# Patient Record
Sex: FEMALE | Race: WHITE | NOT HISPANIC OR LATINO
[De-identification: names, ages, dates, MRNs, and addresses within clinical notes are randomized per-mention and may not be internally consistent; named-entity substitution may affect disease eponyms.]

---

## 2017-12-22 ENCOUNTER — APPOINTMENT (OUTPATIENT)
Dept: PULMONOLOGY | Facility: CLINIC | Age: 77
End: 2017-12-22
Payer: MEDICARE

## 2017-12-22 DIAGNOSIS — R06.02 SHORTNESS OF BREATH: ICD-10-CM

## 2017-12-22 PROCEDURE — 82803 BLOOD GASES ANY COMBINATION: CPT

## 2017-12-22 PROCEDURE — 36600 WITHDRAWAL OF ARTERIAL BLOOD: CPT | Mod: 59

## 2018-04-06 ENCOUNTER — INPATIENT (INPATIENT)
Facility: HOSPITAL | Age: 78
LOS: 0 days | Discharge: ANOTHER HOSPITAL | End: 2018-04-06
Attending: INTERNAL MEDICINE | Admitting: INTERNAL MEDICINE
Payer: MEDICARE

## 2018-04-06 ENCOUNTER — TRANSCRIPTION ENCOUNTER (OUTPATIENT)
Age: 78
End: 2018-04-06

## 2018-04-06 VITALS
SYSTOLIC BLOOD PRESSURE: 145 MMHG | DIASTOLIC BLOOD PRESSURE: 74 MMHG | TEMPERATURE: 98 F | HEART RATE: 75 BPM | OXYGEN SATURATION: 100 % | RESPIRATION RATE: 18 BRPM

## 2018-04-06 VITALS
SYSTOLIC BLOOD PRESSURE: 133 MMHG | HEART RATE: 76 BPM | TEMPERATURE: 98 F | OXYGEN SATURATION: 96 % | RESPIRATION RATE: 16 BRPM | DIASTOLIC BLOOD PRESSURE: 95 MMHG

## 2018-04-06 DIAGNOSIS — R55 SYNCOPE AND COLLAPSE: ICD-10-CM

## 2018-04-06 DIAGNOSIS — E78.5 HYPERLIPIDEMIA, UNSPECIFIED: ICD-10-CM

## 2018-04-06 DIAGNOSIS — M62.82 RHABDOMYOLYSIS: ICD-10-CM

## 2018-04-06 LAB
ALBUMIN SERPL ELPH-MCNC: 3.7 G/DL — SIGNIFICANT CHANGE UP (ref 3.3–5)
ALP SERPL-CCNC: 59 U/L — SIGNIFICANT CHANGE UP (ref 40–120)
ALT FLD-CCNC: 30 U/L — SIGNIFICANT CHANGE UP (ref 4–33)
APTT BLD: 27 SEC — LOW (ref 27.5–37.4)
AST SERPL-CCNC: 59 U/L — HIGH (ref 4–32)
BASE EXCESS BLDV CALC-SCNC: 4.1 MMOL/L — SIGNIFICANT CHANGE UP
BASOPHILS # BLD AUTO: 0.04 K/UL — SIGNIFICANT CHANGE UP (ref 0–0.2)
BASOPHILS NFR BLD AUTO: 0.4 % — SIGNIFICANT CHANGE UP (ref 0–2)
BILIRUB SERPL-MCNC: 0.6 MG/DL — SIGNIFICANT CHANGE UP (ref 0.2–1.2)
BLOOD GAS VENOUS - CREATININE: 0.79 MG/DL — SIGNIFICANT CHANGE UP (ref 0.5–1.3)
BUN SERPL-MCNC: 22 MG/DL — SIGNIFICANT CHANGE UP (ref 7–23)
CALCIUM SERPL-MCNC: 8.2 MG/DL — LOW (ref 8.4–10.5)
CHLORIDE BLDV-SCNC: 99 MMOL/L — SIGNIFICANT CHANGE UP (ref 96–108)
CHLORIDE SERPL-SCNC: 97 MMOL/L — LOW (ref 98–107)
CK MB BLD-MCNC: 0.7 — SIGNIFICANT CHANGE UP (ref 0–2.5)
CK MB BLD-MCNC: 8.39 NG/ML — HIGH (ref 1–4.7)
CK SERPL-CCNC: 1153 U/L — HIGH (ref 25–170)
CK SERPL-CCNC: 1351 U/L — HIGH (ref 25–170)
CO2 SERPL-SCNC: 27 MMOL/L — SIGNIFICANT CHANGE UP (ref 22–31)
CREAT SERPL-MCNC: 0.95 MG/DL — SIGNIFICANT CHANGE UP (ref 0.5–1.3)
EOSINOPHIL # BLD AUTO: 0.03 K/UL — SIGNIFICANT CHANGE UP (ref 0–0.5)
EOSINOPHIL NFR BLD AUTO: 0.3 % — SIGNIFICANT CHANGE UP (ref 0–6)
GAS PNL BLDV: 135 MMOL/L — LOW (ref 136–146)
GLUCOSE BLDV-MCNC: 113 — HIGH (ref 70–99)
GLUCOSE SERPL-MCNC: 120 MG/DL — HIGH (ref 70–99)
HCO3 BLDV-SCNC: 26 MMOL/L — SIGNIFICANT CHANGE UP (ref 20–27)
HCT VFR BLD CALC: 39.7 % — SIGNIFICANT CHANGE UP (ref 34.5–45)
HCT VFR BLDV CALC: 41.9 % — SIGNIFICANT CHANGE UP (ref 34.5–45)
HGB BLD-MCNC: 13 G/DL — SIGNIFICANT CHANGE UP (ref 11.5–15.5)
HGB BLDV-MCNC: 13.6 G/DL — SIGNIFICANT CHANGE UP (ref 11.5–15.5)
IMM GRANULOCYTES # BLD AUTO: 0.03 # — SIGNIFICANT CHANGE UP
IMM GRANULOCYTES NFR BLD AUTO: 0.3 % — SIGNIFICANT CHANGE UP (ref 0–1.5)
INR BLD: 1.11 — SIGNIFICANT CHANGE UP (ref 0.88–1.17)
LACTATE BLDV-MCNC: 1.4 MMOL/L — SIGNIFICANT CHANGE UP (ref 0.5–2)
LYMPHOCYTES # BLD AUTO: 1.22 K/UL — SIGNIFICANT CHANGE UP (ref 1–3.3)
LYMPHOCYTES # BLD AUTO: 13 % — SIGNIFICANT CHANGE UP (ref 13–44)
MCHC RBC-ENTMCNC: 30.3 PG — SIGNIFICANT CHANGE UP (ref 27–34)
MCHC RBC-ENTMCNC: 32.7 % — SIGNIFICANT CHANGE UP (ref 32–36)
MCV RBC AUTO: 92.5 FL — SIGNIFICANT CHANGE UP (ref 80–100)
MONOCYTES # BLD AUTO: 1.11 K/UL — HIGH (ref 0–0.9)
MONOCYTES NFR BLD AUTO: 11.8 % — SIGNIFICANT CHANGE UP (ref 2–14)
NEUTROPHILS # BLD AUTO: 6.96 K/UL — SIGNIFICANT CHANGE UP (ref 1.8–7.4)
NEUTROPHILS NFR BLD AUTO: 74.2 % — SIGNIFICANT CHANGE UP (ref 43–77)
NRBC # FLD: 0 — SIGNIFICANT CHANGE UP
NT-PROBNP SERPL-SCNC: 546.8 PG/ML — SIGNIFICANT CHANGE UP
PCO2 BLDV: 49 MMHG — SIGNIFICANT CHANGE UP (ref 41–51)
PH BLDV: 7.38 PH — SIGNIFICANT CHANGE UP (ref 7.32–7.43)
PLATELET # BLD AUTO: 196 K/UL — SIGNIFICANT CHANGE UP (ref 150–400)
PMV BLD: 11.1 FL — SIGNIFICANT CHANGE UP (ref 7–13)
PO2 BLDV: 28 MMHG — LOW (ref 35–40)
POTASSIUM BLDV-SCNC: 3.4 MMOL/L — SIGNIFICANT CHANGE UP (ref 3.4–4.5)
POTASSIUM SERPL-MCNC: 3.6 MMOL/L — SIGNIFICANT CHANGE UP (ref 3.5–5.3)
POTASSIUM SERPL-SCNC: 3.6 MMOL/L — SIGNIFICANT CHANGE UP (ref 3.5–5.3)
PROT SERPL-MCNC: 6.9 G/DL — SIGNIFICANT CHANGE UP (ref 6–8.3)
PROTHROM AB SERPL-ACNC: 12.3 SEC — SIGNIFICANT CHANGE UP (ref 9.8–13.1)
RBC # BLD: 4.29 M/UL — SIGNIFICANT CHANGE UP (ref 3.8–5.2)
RBC # FLD: 13.7 % — SIGNIFICANT CHANGE UP (ref 10.3–14.5)
SAO2 % BLDV: 45.9 % — LOW (ref 60–85)
SODIUM SERPL-SCNC: 137 MMOL/L — SIGNIFICANT CHANGE UP (ref 135–145)
TROPONIN T SERPL-MCNC: < 0.06 NG/ML — SIGNIFICANT CHANGE UP (ref 0–0.06)
TROPONIN T SERPL-MCNC: < 0.06 NG/ML — SIGNIFICANT CHANGE UP (ref 0–0.06)
TSH SERPL-MCNC: 0.87 UIU/ML — SIGNIFICANT CHANGE UP (ref 0.27–4.2)
WBC # BLD: 9.39 K/UL — SIGNIFICANT CHANGE UP (ref 3.8–10.5)
WBC # FLD AUTO: 9.39 K/UL — SIGNIFICANT CHANGE UP (ref 3.8–10.5)

## 2018-04-06 PROCEDURE — 93971 EXTREMITY STUDY: CPT | Mod: 26

## 2018-04-06 PROCEDURE — 70450 CT HEAD/BRAIN W/O DYE: CPT | Mod: 26

## 2018-04-06 PROCEDURE — 71046 X-RAY EXAM CHEST 2 VIEWS: CPT | Mod: 26

## 2018-04-06 RX ORDER — LEVOTHYROXINE SODIUM 125 MCG
75 TABLET ORAL DAILY
Qty: 0 | Refills: 0 | Status: DISCONTINUED | OUTPATIENT
Start: 2018-04-06 | End: 2018-04-06

## 2018-04-06 RX ORDER — NORTRIPTYLINE HYDROCHLORIDE 10 MG/1
100 CAPSULE ORAL DAILY
Qty: 0 | Refills: 0 | Status: DISCONTINUED | OUTPATIENT
Start: 2018-04-06 | End: 2018-04-06

## 2018-04-06 RX ORDER — NORTRIPTYLINE HYDROCHLORIDE 10 MG/1
100 CAPSULE ORAL
Qty: 0 | Refills: 0 | COMMUNITY

## 2018-04-06 RX ORDER — FUROSEMIDE 40 MG
20 TABLET ORAL ONCE
Qty: 0 | Refills: 0 | Status: COMPLETED | OUTPATIENT
Start: 2018-04-06 | End: 2018-04-06

## 2018-04-06 RX ORDER — EZETIMIBE 10 MG/1
1 TABLET ORAL
Qty: 0 | Refills: 0 | COMMUNITY

## 2018-04-06 RX ORDER — CAPTOPRIL 12.5 MG/1
0 TABLET ORAL
Qty: 0 | Refills: 0 | COMMUNITY

## 2018-04-06 RX ORDER — LEVOTHYROXINE SODIUM 125 MCG
1 TABLET ORAL
Qty: 0 | Refills: 0 | COMMUNITY

## 2018-04-06 RX ORDER — SODIUM CHLORIDE 9 MG/ML
1000 INJECTION INTRAMUSCULAR; INTRAVENOUS; SUBCUTANEOUS
Qty: 0 | Refills: 0 | Status: DISCONTINUED | OUTPATIENT
Start: 2018-04-06 | End: 2018-04-06

## 2018-04-06 RX ADMIN — Medication 20 MILLIGRAM(S): at 10:27

## 2018-04-06 RX ADMIN — NORTRIPTYLINE HYDROCHLORIDE 100 MILLIGRAM(S): 10 CAPSULE ORAL at 15:21

## 2018-04-06 RX ADMIN — SODIUM CHLORIDE 75 MILLILITER(S): 9 INJECTION INTRAMUSCULAR; INTRAVENOUS; SUBCUTANEOUS at 12:28

## 2018-04-06 NOTE — H&P ADULT - NSHPLABSRESULTS_GEN_ALL_CORE
13.0   9.39  )-----------( 196      ( 06 Apr 2018 09:27 )             39.7     04-06    137  |  97<L>  |  22  ----------------------------<  120<H>  3.6   |  27  |  0.95    Ca    8.2<L>      06 Apr 2018 09:27    TPro  6.9  /  Alb  3.7  /  TBili  0.6  /  DBili  x   /  AST  59<H>  /  ALT  30  /  AlkPhos  59  04-06    CARDIAC MARKERS ( 06 Apr 2018 09:27 )  x     / < 0.06 ng/mL / 1351 u/L / x     / x          EKG NSR @ 75 with PVC, ILBB

## 2018-04-06 NOTE — DISCHARGE NOTE ADULT - CARE PLAN
Principal Discharge DX:	Syncope  Goal:	Treat reversible causes of syncope  Assessment and plan of treatment:	You were admitted to cardiac monitoring unit to rule out arrhthymias, you've were noted to have intraventricular conduction delay  A CAT of the head was unremarkable  You were ruled out for myocardial infarction with 2 negative troponins  You were found to be orthostatic positive and you were started on IV hydration  Fall precautions: keep your area clutter free, place night lights in hallways and stairwells, and add non-slip mats in the kitchen and bathrooms.  Exercise daily to improve muscle strength to avoid deconditioning.   You are being transferred to [facility] for further treatment and workup  Secondary Diagnosis:	Traumatic rhabdomyolysis, initial encounter  Goal:	Monitor CK levels  Assessment and plan of treatment:	We held your Zetia during your admission for elevated CPK levels  You received IV hydration to protect your kidney from damage from elevated CPK levels  Your CPK levels were improving with IV hydration/fluids  Follow up at [facility] to determine when it's safe to re-start Zetia  Secondary Diagnosis:	Essential hypertension  Goal:	To maintain a normal blood pressure to prevent heart attack, stroke and renal failure.  Assessment and plan of treatment:	Low sodium diet, continue blood pressure medications as instructed by your primary care physician. Monitor your BP regularly.  Secondary Diagnosis:	Hyperlipidemia, unspecified hyperlipidemia type  Goal:	To maintain normal cholesterol levels to prevent stroke, coronary artery disease, peripheral vascular disease and heart attacks.  Assessment and plan of treatment:	Low fat diet, exercise daily. Restart your cholesterol medications when deemed safe by your doctor. Follow up with primary care physician and cardiologist for management.  Secondary Diagnosis:	Fibromyalgia  Goal:	Manage symptoms  Assessment and plan of treatment:	Cont nortriptyline  Secondary Diagnosis:	Hypothyroidism  Goal:	To maintain a normal thyroid level.  Assessment and plan of treatment:	Continue your thyroid medications as prescribed  Your TSH was 0.87, within normal limits  Secondary Diagnosis:	Edema of right lower extremity  Goal:	Decrease edema of extremity  Assessment and plan of treatment:	A duplex was obtained of your right lower leg and was negative for DVT.   Elevate your legs regularly to decrease edema and swelling.

## 2018-04-06 NOTE — H&P ADULT - CONSTITUTIONAL DETAILS
well-developed/well-nourished/no distress/well-groomed well-developed/obese/well-groomed/no distress/well-nourished

## 2018-04-06 NOTE — H&P ADULT - HISTORY OF PRESENT ILLNESS
77 year old female pmh of HTN, HLD, Firbomyalgia, lymphedema, OA, JAMMIE on CPAP,  Hypothyroid, allergic sinusitis p/w multiple falls/syncopal episdoes. States that has had 2 falls in last two days.  Notes dizziness before falls, sometimes hard to focus vision and other times like she will fall. When this happens collapses to ground. So far no head trauma or other bodily trauma. Notes that chronically has intermittent episodes of blood pressure being high and then dropping. Often feels like her legs can not keep her up. occasionally dizzy after standing from seated position. Was at [facility] on 4/5 after her first fall, gave her fluid and checked her blood sugar and discharged her, she was told her EKG was okay when at [place]. Right leg edema is worse than usual Denies CP, palpitations, fever, chills, SOB, orthopnea, PND, abd pain, dysuria, hematuria, BRBPR, melena, visual change, headache, weakness.

## 2018-04-06 NOTE — DISCHARGE NOTE ADULT - HOSPITAL COURSE
HPI:  77 year old female pmh of HTN, HLD, Firbomyalgia, lymphedema, OA, JAMMIE on CPAP,  Hypothyroid, allergic sinusitis p/w multiple falls/syncopal episodes States that has had 2 falls in last two days.  Notes dizziness before falls, sometimes hard to focus vision and other times like she will fall. When this happens collapses to ground. So far no head trauma or other bodily trauma. Notes that chronically has intermittent episodes of blood pressure being high and then dropping. Often feels like her legs can not keep her up. Occasionally dizzy after standing from seated position. Was at [facility] on 4/5 after her first fall, gave her fluid and checked her blood sugar and discharged her, she was told her EKG was okay when at [place]. Right leg edema is worse than usual Denies CP, palpitations, fever, chills, SOB, orthopnea, PND, abd pain, dysuria, hematuria, BRBPR, melena, visual change, headache, weakness. (06 Apr 2018 11:02)    Hospital Course:   Pt admitted to telemetry to r/o cardiac syncope. EKG showed: NSR @ 75 with PVC, ILBB. On tele, ICVD was noted. Orthostatics obtained were positive and pt was started on IVF. Pt also noted to have an elevated CPK in setting of fall, which improved with IV fluids. Cardiac enzymes x 2 were negative. CT head was negative. Pt then transferred to [facility] for further treatment and evaluation. HPI:  77 year old female pmh of HTN, HLD, Firbomyalgia, lymphedema, OA, JAMMIE on CPAP,  Hypothyroid, allergic sinusitis p/w multiple falls/syncopal episodes States that has had 2 falls in last two days.  Notes dizziness before falls, sometimes hard to focus vision and other times like she will fall. When this happens collapses to ground. So far no head trauma or other bodily trauma. Notes that chronically has intermittent episodes of blood pressure being high and then dropping. Often feels like her legs can not keep her up. Occasionally dizzy after standing from seated position. Was at [facility] on 4/5 after her first fall, gave her fluid and checked her blood sugar and discharged her, she was told her EKG was okay when at [place]. Right leg edema is worse than usual Denies CP, palpitations, fever, chills, SOB, orthopnea, PND, abd pain, dysuria, hematuria, BRBPR, melena, visual change, headache, weakness. (06 Apr 2018 11:02)    Hospital Course:   Pt admitted to telemetry to r/o cardiac syncope. EKG showed: NSR @ 75 with PVC, ILBB. On tele, IVCD was noted. Orthostatics obtained were positive and pt was started on IVF. Pt also noted to have an elevated CPK in setting of fall, which improved with IV fluids. Cardiac enzymes x 2 were negative. CT head was negative. Pt then transferred to [facility] for further treatment and evaluation.

## 2018-04-06 NOTE — CONSULT NOTE ADULT - SUBJECTIVE AND OBJECTIVE BOX
cc syncope  [place] 77 year old female pmh of HTN, HLD, Firbomyalgia, lymphedema, OA, JAMMIE on CPAP,  Hypothyroid, allergic sinusitis p/w multiple falls/syncopal episdoes. States that has had 2 falls in last two days.  Notes dizziness before falls, sometimes hard to focus vision and other times like she will fall. When this happens collapses to ground. So far no head trauma or other bodily trauma. Notes that chronically has intermittent episodes of blood pressure being high and then dropping. Often feels like her legs can not keep her up. occasionally dizzy after standing from seated position. Was at [facility] on 4/5 after her first fall, gave her fluid and checked her blood sugar and discharged her, she was told her EKG was okay when at [place]. Right leg edema is worse than usual Denies CP, palpitations, fever, chills, SOB, orthopnea, PND, abd pain, dysuria, hematuria, BRBPR, melena, visual change, headache, weakness.     Allergies Iodine, statin    REVIEW OF SYSTEMS:    CONSTITUTIONAL: No weakness, fevers or chills  EYES/ENT: No visual changes;  No vertigo or throat pain   NECK: No pain or stiffness  RESPIRATORY: No cough, wheezing, hemoptysis; No shortness of breath  CARDIOVASCULAR: No chest pain or palpitations  GASTROINTESTINAL: No abdominal or epigastric pain. No nausea, vomiting, or hematemesis; No diarrhea or constipation. No melena or hematochezia.  GENITOURINARY: No dysuria, frequency or hematuria  NEUROLOGICAL: No numbness or weakness  SKIN: No itching, burning, rashes, or lesions   All other review of systems is negative unless indicated above.      Home Medications:   * Patient Currently Takes Medications as of 06-Apr-2018 11:16 documented in Structured Notes  · 	nortriptyline: 100 milligram(s) orally once a day, Last Dose Taken:    · 	Zetia 10 mg oral tablet: 1 tab(s) orally once a day, Last Dose Taken:    · 	Synthroid 75 mcg (0.075 mg) oral tablet: 1 tab(s) orally once a day, Last Dose Taken:    · 	captopril: only takes as needed when BP elevated, Last Dose Taken:      .    Patient History:   Past Medical History:  Arthritis    Essential hypertension    Fibromyalgia    Hyperlipidemia, unspecified hyperlipidemia type    Incontinence    Sinusitis.    Past Surgical History:  No significant past surgical history.  Family History:  Sibling  Still living? No  Family history of colon cancer, Age at diagnosis: 61-70.    Social History:  Social History (marital status, living situation, occupation, tobacco use, alcohol and drug use, and sexual history): Lives with , retired teacher  Never smoked, no etoh, no illicit drug NO assistive devices at home, no skilled nursing help at home	    Tobacco Screening:  · Core Measure Site	No	      Physical exam    General: WN/WD NAD  PERRLA  Neurology: A&Ox3, nonfocal, PHILLIP x 4  Respiratory: CTA B/L  CV: RRR, S1S2, no murmurs, rubs or gallops  Abdominal: Soft, NT, ND +BS, Last BM  Extremities: No edema, + peripheral pulses  Skin Normal     labs    Lab Results:  CBC  CBC Full  -  ( 06 Apr 2018 09:27 )  WBC Count : 9.39 K/uL  Hemoglobin : 13.0 g/dL  Hematocrit : 39.7 %  Platelet Count - Automated : 196 K/uL  Mean Cell Volume : 92.5 fL  Mean Cell Hemoglobin : 30.3 pg  Mean Cell Hemoglobin Concentration : 32.7 %  Auto Neutrophil # : 6.96 K/uL  Auto Lymphocyte # : 1.22 K/uL  Auto Monocyte # : 1.11 K/uL  Auto Eosinophil # : 0.03 K/uL  Auto Basophil # : 0.04 K/uL  Auto Neutrophil % : 74.2 %  Auto Lymphocyte % : 13.0 %  Auto Monocyte % : 11.8 %  Auto Eosinophil % : 0.3 %  Auto Basophil % : 0.4 %    .		Differential:	[] Automated		[] Manual  Chemistry                        13.0   9.39  )-----------( 196      ( 06 Apr 2018 09:27 )             39.7     04-06    137  |  97<L>  |  22  ----------------------------<  120<H>  3.6   |  27  |  0.95    Ca    8.2<L>      06 Apr 2018 09:27    TPro  6.9  /  Alb  3.7  /  TBili  0.6  /  DBili  x   /  AST  59<H>  /  ALT  30  /  AlkPhos  59  04-06    LIVER FUNCTIONS - ( 06 Apr 2018 09:27 )  Alb: 3.7 g/dL / Pro: 6.9 g/dL / ALK PHOS: 59 u/L / ALT: 30 u/L / AST: 59 u/L / GGT: x           PT/INR - ( 06 Apr 2018 09:27 )   PT: 12.3 SEC;   INR: 1.11          PTT - ( 06 Apr 2018 09:27 )  PTT:27.0 SEC          MICROBIOLOGY/CULTURES:      RADIOLOGY RESULTS: reviewed

## 2018-04-06 NOTE — ED PROVIDER NOTE - PHYSICAL EXAMINATION
Pt appears to have chronic medical morbidities, may have fluid retention. Mild facial asymmetry which pt states is chronic, no pronator drift or other acute neurologic deficits.

## 2018-04-06 NOTE — H&P ADULT - NSHPSOCIALHISTORY_GEN_ALL_CORE
Lives with , retired teacher  Never smoked, no etoh, no illicit drug  NO assistive devices at home, no skilled nursing help at home

## 2018-04-06 NOTE — ED PROVIDER NOTE - PROGRESS NOTE DETAILS
Vinnie ALTMAN: I was called to perform stroke evaluation in triage.  Pt c/o bilateral leg weakness, inability to stand.  Sxs started yesterday and worse since this morning.  Pt also noted to have facial droop, which she states is old.  Strength equal in bilat lower extremities 4/5, no other focal deficits.  No stroke code. DORINA Canada: Unit Receptionist attempted to contact PCP multiple times at phone numbers listed in HPI, unable to reach PMD. PMD Kristine Chan is not listed as having admitting privileges and pt states she does not have a cardiologist. DORINA Canada, PCP Dr. Chan called back, states pt has additional history of HTN, HLD sleep apnea not on additional meds. Dr. Chan agres to have pt admitted to [facility], states pt does not have a cardiologist and she does not admit to [facility].

## 2018-04-06 NOTE — DISCHARGE NOTE ADULT - MEDICATION SUMMARY - MEDICATIONS TO TAKE
I will START or STAY ON the medications listed below when I get home from the hospital:    captopril  -- only takes as needed when BP elevated  -- Indication: For Essential hypertension    nortriptyline  -- 100 milligram(s) by mouth once a day  -- Indication: For Fibromyalgia    Zetia 10 mg oral tablet  -- 1 tab(s) by mouth once a day  -- Indication: For Hyperlipidemia, unspecified hyperlipidemia type    Synthroid 75 mcg (0.075 mg) oral tablet  -- 1 tab(s) by mouth once a day  -- Indication: For Hypothyroidism

## 2018-04-06 NOTE — ED PROVIDER NOTE - MUSCULOSKELETAL, MLM
Spine appears normal, range of motion is not limited, no muscle or joint tenderness Spine appears normal, range of motion is not limited, no muscle or joint tenderness 3+ edema BLE

## 2018-04-06 NOTE — H&P ADULT - NSHPREVIEWOFSYSTEMS_GEN_ALL_CORE
Denies CP, palpitations, fever, chills, SOB, orthopnea, PND, abd pain, dysuria, hematuria, BRBPR, melena, visual change, headache, weakness.

## 2018-04-06 NOTE — ED ADULT NURSE NOTE - OBJECTIVE STATEMENT
Pt rcvd to room 5 c/o weakness, states she fell after legs "gave in and buckled." Denies feeling dizzy or lightheaded before falling. AS per pt, weakness was mostly in the legs. Also c/o cough. Denies fever/chills, N/V/D, urinary symptoms. + significant swelling/redness observed to B/L lower extrem. As per pt, this is chronic. Denies SOB at this time. Pt is NSR on cardiac monitor with occasional PVCs. Aox3, ambulatory at baseline. Evaluated by MD. Labs drawn & sent. 20g IV placed to L ac. Will continue to monitor.

## 2018-04-06 NOTE — H&P ADULT - PMH
Arthritis    Essential hypertension    Fibromyalgia    Hyperlipidemia, unspecified hyperlipidemia type    Incontinence    Sinusitis

## 2018-04-06 NOTE — DISCHARGE NOTE ADULT - PATIENT PORTAL LINK FT
You can access the [facility] Patient Portal, offered by [facility], by registering with the following website: [email or web address]

## 2018-04-06 NOTE — ED PROVIDER NOTE - OBJECTIVE STATEMENT
78 Y/O F states she has a PMH of Fibromyalgia, arthritis, Hypothyroidism, lymphedema, transient urinary incontinence, chronic allergic sinusitis on Zedia, Synthroid, Nortriptyline C/O B/L LE weakness, falls for the past 2 days. She states she suddenly fell down like a black curtain over her eyes, denies any other symptoms such as CP, SOB or palpitations while falling. She states she did not completely pass out and tried to grab on/brace herself while falling, denies hitting her head or having numbness, weakness or LOC. States the walking has been more difficult over the past few days but states her legs "aren't the problem they are always swollen." An ambulance was called when she initially fell and she was taken to [facility]. She states she received fluid and a blood sugar check in the ED and was then discharged from ED. She states she had fallen in the past but that was some time ago. States she is just here for weakness, denies symptoms such as CP, SOB, ABD Pn or difficulty breathing. Other than her 2 falls from standing denies other acute trauma, numbness, weakness or pain.     PCP Dr. genesis Chan office  Personal [phone].

## 2018-04-06 NOTE — ED ADULT TRIAGE NOTE - CHIEF COMPLAINT QUOTE
Pt st" Since yesterday both my legs are weak...I went to [facility] given fluid and felt better went home was able to walk.....this has happen before especially if pressure is low....also just started CPAP 2 months ago.  This morning I got up and just could not stand., speech clear, noted facial droop (Pt my face always droops for years.) Mue=strength, gay lower ext equally weak.

## 2018-04-06 NOTE — DISCHARGE NOTE ADULT - SECONDARY DIAGNOSIS.
Hyperlipidemia, unspecified hyperlipidemia type Fibromyalgia Hypothyroidism Edema of right lower extremity Traumatic rhabdomyolysis, initial encounter Essential hypertension

## 2018-04-06 NOTE — ED ADULT NURSE NOTE - CHIEF COMPLAINT QUOTE
Pt st" Since yesterday both my legs are weak...I went to [facility] given fluid and felt better went home was able to walk.....this has happen before especially if pressure is low....also just started CPAP 2 months ago.  This morning I got up and just could not stand., speech clear, noted facial droop (Pt my face always droops for years.) Mue=strength, gay lower ext equally weak.

## 2018-04-06 NOTE — H&P ADULT - RS GEN PE MLT RESP DETAILS PC
airway patent/breath sounds equal/no rhonchi/no wheezes/good air movement/clear to auscultation bilaterally/no rales

## 2018-04-06 NOTE — H&P ADULT - ASSESSMENT
77 year old female pmh of HTN, HLD, Firbomyalgia, lymphedema, OA, JAMMIE on CPAP,  Hypothyroid, allergic sinusitis p/w multiple falls/syncopal episdoes admit to tele r/o cardiogenic syncope

## 2018-04-06 NOTE — ED PROVIDER NOTE - ATTENDING CONTRIBUTION TO CARE
I performed a face to face evaluation of this patient and obtained a history and performed a full exam.  I agree with the history, physical exam and plan of the PA.  Pt with h/o arthritis and fibromyalgia with weakness, near syncope fall x 2, generalized weakness, no cp or sob, awake in nad, lungs with crackles, abd soft nontender, heart wnl, legs 3+= edema neuro wnl, r/o chf, neuro, metabolic cause, labs, ekg, head ct, admit for monitoring and further workup

## 2018-04-06 NOTE — DISCHARGE NOTE ADULT - PLAN OF CARE
Low sodium diet, continue blood pressure medications as instructed by your primary care physician. Monitor your BP regularly. To maintain normal cholesterol levels to prevent stroke, coronary artery disease, peripheral vascular disease and heart attacks. Low fat diet, exercise daily. Restart your cholesterol medications when deemed safe by your doctor. Follow up with primary care physician and cardiologist for management. Manage symptoms Cont nortriptyline To maintain a normal thyroid level. Continue your thyroid medications as prescribed  Your TSH was 0.87, within normal limits Decrease edema of extremity A duplex was obtained of your right lower leg and was negative for DVT.   Elevate your legs regularly to decrease edema and swelling. Treat reversible causes of syncope You were admitted to cardiac monitoring unit to rule out arrhthymias, you've were noted to have intraventricular conduction delay  A CAT of the head was unremarkable  You were ruled out for myocardial infarction with 2 negative troponins  You were found to be orthostatic positive and you were started on IV hydration  Fall precautions: keep your area clutter free, place night lights in hallways and stairwells, and add non-slip mats in the kitchen and bathrooms.  Exercise daily to improve muscle strength to avoid deconditioning.   You are being transferred to [facility] for further treatment and workup Monitor CK levels We held your Zetia during your admission for elevated CPK levels  You received IV hydration to protect your kidney from damage from elevated CPK levels  Your CPK levels were improving with IV hydration/fluids  Follow up at [facility] to determine when it's safe to re-start Zetia To maintain a normal blood pressure to prevent heart attack, stroke and renal failure.

## 2018-04-06 NOTE — DISCHARGE NOTE ADULT - CARE PROVIDER_API CALL
Mary Ann Chan), Internal Medicine  [address] [place] [address]  Phone: [phone]  Fax: [phone]

## 2018-04-06 NOTE — ED PROVIDER NOTE - MEDICAL DECISION MAKING DETAILS
78 Y/O F PMH  Fibromyalgia, arthritis, Hypothyroidism, lymphedema, transient urinary incontinence, chronic allergic sinusitis C/O blilateral LE and diffuse weakness and 2 near syncopal episodes over the past 2 days. She denies associated symptoms during these falls. Pt on telemetry, appears to have crhonic medical conditions but is acutely stable. CT to R/O intracranial pathology, EKG negative for STEMI, Labs obtained including cardiac enzymes. Patient will likely require admission due to inability to ambulate and multiple near-syncopal/syncopal episodes over the past 2 days. 76 Y/O F PMH  Fibromyalgia, arthritis, Hypothyroidism, lymphedema, transient urinary incontinence, chronic allergic sinusitis C/O blilateral LE and diffuse weakness and 2 near syncopal episodes over the past 2 days. She denies associated symptoms during these falls. Pt on telemetry, appears to have crhonic medical conditions but is acutely stable. CT to R/O intracranial pathology, EKG negative for STEMI, Labs obtained including cardiac enzymes. Patient will likely require admission due to inability to ambulate and multiple near-syncopal/syncopal episodes over the past 2 days. Labs reviewed, grossly normal, pt to be admitted to telemetry due to multiple syncopal episodes requiring overnight monitoring and cardiology evaluation.

## 2018-04-06 NOTE — CONSULT NOTE ADULT - ASSESSMENT
77 year old female pmh of HTN, HLD, Firbomyalgia, lymphedema, OA, JAMMIE on CPAP,  Hypothyroid, allergic sinusitis p/w multiple falls/syncopal episdoes admit to tele r/o cardiogenic syncope    Problem/Plan - 1:  ·  Problem: Syncope, unspecified syncope type.  Plan: atelemonitor  check ECHO  Orthostatics  CARDS FU  Neuro consult in am  may need EP consult    Problem/Plan - 2:  ·  Problem: Hyperlipidemia, unspecified hyperlipidemia type.  Plan: Check lipid panel.       Problem/Plan - 3:  ·  Problem: Rhabdomyolysis.  Plan: Gentle IVF.   monitor CPK

## 2018-08-31 PROBLEM — M19.90 UNSPECIFIED OSTEOARTHRITIS, UNSPECIFIED SITE: Chronic | Status: ACTIVE | Noted: 2018-04-06

## 2018-08-31 PROBLEM — I10 ESSENTIAL (PRIMARY) HYPERTENSION: Chronic | Status: ACTIVE | Noted: 2018-04-06

## 2018-08-31 PROBLEM — E78.5 HYPERLIPIDEMIA, UNSPECIFIED: Chronic | Status: ACTIVE | Noted: 2018-04-06

## 2018-08-31 PROBLEM — M79.7 FIBROMYALGIA: Chronic | Status: ACTIVE | Noted: 2018-04-06

## 2018-08-31 PROBLEM — R32 UNSPECIFIED URINARY INCONTINENCE: Chronic | Status: ACTIVE | Noted: 2018-04-06

## 2018-08-31 PROBLEM — J32.9 CHRONIC SINUSITIS, UNSPECIFIED: Chronic | Status: ACTIVE | Noted: 2018-04-06

## 2018-10-08 ENCOUNTER — APPOINTMENT (OUTPATIENT)
Dept: PULMONOLOGY | Facility: CLINIC | Age: 78
End: 2018-10-08
Payer: MEDICARE

## 2018-10-08 ENCOUNTER — RECORD ABSTRACTING (OUTPATIENT)
Age: 78
End: 2018-10-08

## 2018-10-08 DIAGNOSIS — G47.33 OBSTRUCTIVE SLEEP APNEA (ADULT) (PEDIATRIC): ICD-10-CM

## 2018-10-08 PROCEDURE — 99214 OFFICE O/P EST MOD 30 MIN: CPT

## 2018-10-09 PROBLEM — G47.33 OSA (OBSTRUCTIVE SLEEP APNEA): Status: ACTIVE | Noted: 2018-10-08

## 2020-04-23 ENCOUNTER — APPOINTMENT (OUTPATIENT)
Dept: ENDOCRINOLOGY | Facility: CLINIC | Age: 80
End: 2020-04-23
Payer: MEDICARE

## 2020-04-23 VITALS
BODY MASS INDEX: 36.37 KG/M2 | DIASTOLIC BLOOD PRESSURE: 84 MMHG | OXYGEN SATURATION: 96 % | SYSTOLIC BLOOD PRESSURE: 118 MMHG | TEMPERATURE: 97.7 F | WEIGHT: 240 LBS | HEART RATE: 75 BPM | HEIGHT: 68 IN

## 2020-04-23 PROCEDURE — 36415 COLL VENOUS BLD VENIPUNCTURE: CPT

## 2020-04-23 PROCEDURE — 99214 OFFICE O/P EST MOD 30 MIN: CPT | Mod: 25

## 2020-04-23 PROCEDURE — 76536 US EXAM OF HEAD AND NECK: CPT

## 2020-04-24 LAB
25(OH)D3 SERPL-MCNC: 28.1 NG/ML
ALBUMIN SERPL ELPH-MCNC: 4.2 G/DL
ALP BLD-CCNC: 54 U/L
ALT SERPL-CCNC: 12 U/L
ANION GAP SERPL CALC-SCNC: 11 MMOL/L
AST SERPL-CCNC: 15 U/L
BILIRUB SERPL-MCNC: 0.5 MG/DL
BUN SERPL-MCNC: 25 MG/DL
CALCIUM SERPL-MCNC: 10.2 MG/DL
CHLORIDE SERPL-SCNC: 101 MMOL/L
CHOLEST SERPL-MCNC: 215 MG/DL
CO2 SERPL-SCNC: 29 MMOL/L
CREAT SERPL-MCNC: 0.97 MG/DL
GLUCOSE SERPL-MCNC: 83 MG/DL
HDLC SERPL-MCNC: 80 MG/DL
LDLC SERPL DIRECT ASSAY-MCNC: 117 MG/DL
POTASSIUM SERPL-SCNC: 4.4 MMOL/L
PROT SERPL-MCNC: 6.8 G/DL
SODIUM SERPL-SCNC: 141 MMOL/L
T3FREE SERPL-MCNC: 2.41 PG/ML
T4 FREE SERPL-MCNC: 1.4 NG/DL
TRIGL SERPL-MCNC: 102 MG/DL
TSH SERPL-ACNC: 1.63 UIU/ML

## 2020-04-26 NOTE — HISTORY OF PRESENT ILLNESS
[FreeTextEntry1] : Ms. EUN RANDALL   is a 79 year  year old  female who returns today in follow up with regard to a history of hypothyroidism.  She  is currently taking Levothyroxine 75 mcg daily and has had renewals of late per her pmd Dr. Mary Ann Chan.  She  has been compliant in taking the LT4 daily, away from food or any medication that may inhibit absorption. She  has tolerated this medication well. Without any apparent adverse effects.  She denies any temperature intolerance, significant weight changes, or severe fatigue. She  in addition denies any palpitations, tremors, anxiousness, change in bowel habits or significant change in moods. She too has hx of nodular thyroid with apparent benign fna over 7 years ago. Follow up  sonograms have apparently been stable/nodularity was smaller.\par Additional medical history includes  hyperlipidemia and ? diffuse neuropathy for which she takes Nortriptyline.\par \par   .\par \par \par

## 2020-04-26 NOTE — PHYSICAL EXAM
[Well Nourished] : well nourished [Alert] : alert [Well Developed] : well developed [No Acute Distress] : no acute distress [Normal Sclera/Conjunctiva] : normal sclera/conjunctiva [EOMI] : extra ocular movement intact [Normal Oropharynx] : the oropharynx was normal [No Proptosis] : no proptosis [No Thyroid Nodules] : there were no palpable thyroid nodules [Thyroid Not Enlarged] : the thyroid was not enlarged [Clear to Auscultation] : lungs were clear to auscultation bilaterally [No Accessory Muscle Use] : no accessory muscle use [No Respiratory Distress] : no respiratory distress [Normal Rate] : heart rate was normal [Normal S1, S2] : normal S1 and S2 [Regular Rhythm] : with a regular rhythm [No Edema] : no peripheral edema [Pedal Pulses Normal] : the pedal pulses are present [Not Distended] : not distended [Not Tender] : non-tender [Normal Bowel Sounds] : normal bowel sounds [Soft] : abdomen soft [Normal Anterior Cervical Nodes] : no anterior cervical lymphadenopathy [Spine Straight] : spine straight [No Spinal Tenderness] : no spinal tenderness [Normal Posterior Cervical Nodes] : no posterior cervical lymphadenopathy [Normal Gait] : normal gait [No Stigmata of Cushings Syndrome] : no stigmata of Cushings Syndrome [Normal Strength/Tone] : muscle strength and tone were normal [No Rash] : no rash [No Tremors] : no tremors [Normal Reflexes] : deep tendon reflexes were 2+ and symmetric [Oriented x3] : oriented to person, place, and time [Acanthosis Nigricans] : no acanthosis nigricans [de-identified] : Thyroid heterogenous bilat.

## 2020-08-11 NOTE — ED ADULT NURSE NOTE - CCCP TRG CHIEF CMPLNT
S/w patient today regarding state of diabetes and current medication regimen.  She states that copay for Xultophy came up at >$1000 when refilled.  Suspect that pharmacy did not process coupon card in conjunction with refill.  Will reach out to [facility] to determine why cost has increased so much.  Follow up appointment made for two weeks in clinic.  Sriram Zamorano, PharmD, BCACP    Confirmed with pharmacy that Xultophy is ~$650 even with coupon card savings.  Will reach out to insurance to see why copay has increased so much and get back to patient by week's end.  May have to transition her back to separate long acting insulin and GLP1.  Sriram Zamorano, [place], BCACP    
weakness/gay leg.

## 2021-05-03 ENCOUNTER — APPOINTMENT (OUTPATIENT)
Dept: ORTHOPEDIC SURGERY | Facility: CLINIC | Age: 81
End: 2021-05-03
Payer: MEDICARE

## 2021-05-03 VITALS — WEIGHT: 248 LBS | BODY MASS INDEX: 37.59 KG/M2 | HEIGHT: 68 IN

## 2021-05-03 PROCEDURE — 72082 X-RAY EXAM ENTIRE SPI 2/3 VW: CPT

## 2021-05-03 PROCEDURE — 99204 OFFICE O/P NEW MOD 45 MIN: CPT

## 2021-05-03 NOTE — ASSESSMENT
[FreeTextEntry1] : This is an 80-year-old female here today for evaluation of her back, bilateral buttock, below knee pain.  I had a long discussion in regards to treatment options for the patient.  She has seen pain management in the past and was given gabapentin which unfortunately she did not tolerate.  She is now on nortriptyline which she states does help her symptoms to some degree.  I discussed the possible role of an epidural steroid injection given her MRI showing multiple levels of lateral recess stenosis.  She wanted to hold off on this which I think is reasonable.  We will proceed with physical therapy.  I will see her again in 4 to 6 weeks for repeat clinical evaluation.  I encouraged her to follow-up sooner if she has any new or worsening symptoms.  Please note that over 45 minutes of time was spent in care of this patient which includes previsit preparation, in person visit, post visit documentation.

## 2021-05-03 NOTE — HISTORY OF PRESENT ILLNESS
[de-identified] : This is an 80-year-old female here today for evaluation of her low back and bilateral buttock pain.  These symptoms have been ongoing for past several months.  She does describe right buttock pain that does radiate down into her thigh.  She also describes bilateral below the knee pain as well.  Currently she finds it difficult to walk any amount of distance without a walker.  At best she can walk 1 block with a walker but she has to stop due to significant pain below her knees.  She does feel better when she leans forward on a shopping cart.  She denies any bowel bladder issues.  She denies any saddle anesthesia.

## 2021-05-03 NOTE — PHYSICAL EXAM
[de-identified] : Lumbar Physical Exam\par \par Gait -shuffling gait with a walker\par \par Station -forward pitched\par \par Sagittal balance -positive \par \par Compensatory mechanism? - None\par \par Heel walk -unable to do\par \par Toe walk -unable to do\par \par Reflexes\par Patellar - normal\par Gastroc - normal\par Clonus - No\par \par Hip Exam -reduced range of motion\par \par Straight leg raise - none\par \par Pulses - 2+ dp/pt\par \par Range of motion - normal\par \par Sensation \par Sensation intact to light touch in L1, L2, L3, L4, L5 and S1 dermatomes bilaterally\par \par Motor\par 	IP	Quad	HS	TA	Gastroc	EHL\par Right	5/5	5/5	5/5	5/5	5/5	5/5\par Left	5/5	5/5	5/5	5/5	5/5	5/5 [de-identified] : Scoliosis radiographs\par Lumbar lordosis is approximately 60 degrees\par Pelvic incidence is approximately 52 degrees\par SVA within normal limits\par Degenerative scoliosis noted\par \par Lumbar MRI\par No areas of critical central stenosis\par There 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

## 2021-08-27 ENCOUNTER — APPOINTMENT (OUTPATIENT)
Dept: ORTHOPEDIC SURGERY | Facility: CLINIC | Age: 81
End: 2021-08-27
Payer: MEDICARE

## 2021-08-27 VITALS
DIASTOLIC BLOOD PRESSURE: 88 MMHG | HEIGHT: 68 IN | BODY MASS INDEX: 36.37 KG/M2 | SYSTOLIC BLOOD PRESSURE: 161 MMHG | WEIGHT: 240 LBS | HEART RATE: 68 BPM

## 2021-08-27 DIAGNOSIS — M54.5 LOW BACK PAIN: ICD-10-CM

## 2021-08-27 PROCEDURE — 99214 OFFICE O/P EST MOD 30 MIN: CPT

## 2021-08-27 NOTE — PHYSICAL EXAM
[de-identified] : Lumbar Physical Exam\par \par Gait -shuffling gait with a walker\par \par Station -forward pitched\par \par Sagittal balance -positive \par \par Compensatory mechanism? - None\par \par Heel walk -unable to do\par \par Toe walk -unable to do\par \par Reflexes\par Patellar - normal\par Gastroc - normal\par Clonus - No\par \par Hip Exam -reduced range of motion\par \par Straight leg raise - none\par \par Pulses - 2+ dp/pt\par \par Range of motion - normal\par \par Sensation \par Sensation intact to light touch in L1, L2, L3, L4, L5 and S1 dermatomes bilaterally\par \par Motor\par 	IP	Quad	HS	TA	Gastroc	EHL\par Right	5/5	5/5	5/5	5/5	5/5	5/5\par Left	5/5	5/5	5/5	5/5	5/5	5/5 [de-identified] : Scoliosis radiographs\par Lumbar lordosis is approximately 60 degrees\par Pelvic incidence is approximately 52 degrees\par SVA within normal limits\par Degenerative scoliosis noted\par \par Lumbar MRI\par No areas of critical central stenosis\par There 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

## 2021-08-27 NOTE — ASSESSMENT
[FreeTextEntry1] : Since her last visit the patient has been seen by a chiropractor who was able to help her symptoms to some degree.  At this point I would encourage her to continue working with her chiropractor and continue to ambulate as much as she can.  She can take Tylenol/NSAIDs as needed for pain control.  I will see her again in 4 weeks for repeat clinical evaluation.  I encouraged her to follow-up sooner if she has any new or worsening symptoms.  Please note that over 30 minutes of time spent in care of this patient which includes previsit preparation, in person visit, post visit documentation.

## 2021-08-27 NOTE — HISTORY OF PRESENT ILLNESS
[de-identified] : Today the patient states that she is doing well.  She has had improvement in her symptoms with treatment from a chiropractor.  Unfortunate she did not find as much benefit in physical therapy has her .  She denies any red flag symptoms in terms of bowel bladder issues or new onset weakness or numbness or tingling.  She denies any saddle anesthesia.\par \par 05/03/21\par This is an 80-year-old female here today for evaluation of her low back and bilateral buttock pain.  These symptoms have been ongoing for past several months.  She does describe right buttock pain that does radiate down into her thigh.  She also describes bilateral below the knee pain as well.  Currently she finds it difficult to walk any amount of distance without a walker.  At best she can walk 1 block with a walker but she has to stop due to significant pain below her knees.  She does feel better when she leans forward on a shopping cart.  She denies any bowel bladder issues.  She denies any saddle anesthesia.

## 2023-03-31 ENCOUNTER — APPOINTMENT (OUTPATIENT)
Dept: ENDOCRINOLOGY | Facility: CLINIC | Age: 83
End: 2023-03-31
Payer: MEDICARE

## 2023-03-31 VITALS
HEIGHT: 68 IN | WEIGHT: 227.5 LBS | OXYGEN SATURATION: 92 % | DIASTOLIC BLOOD PRESSURE: 80 MMHG | HEART RATE: 58 BPM | TEMPERATURE: 97 F | BODY MASS INDEX: 34.48 KG/M2 | SYSTOLIC BLOOD PRESSURE: 130 MMHG

## 2023-03-31 PROCEDURE — 36415 COLL VENOUS BLD VENIPUNCTURE: CPT

## 2023-03-31 PROCEDURE — 99214 OFFICE O/P EST MOD 30 MIN: CPT | Mod: 25

## 2023-03-31 NOTE — IMPRESSION
[FreeTextEntry1] : Heterogenous thyroid bilaterally with sub-centimeter nodularity as outlined above. The nodularity is non-high risk per Tirads criteria and thus no intervention is needed at this time. I have asked the patient to follow up in 9-12 months for thyroid reassessment and repeat thyroid ultrasound.\par \par

## 2023-03-31 NOTE — PROCEDURE
[[employer] e 2008 model, 10-12 MHz frequencies] : multiple real time longitudinal and transverse images were obtained using a high resolution ultrasound with a linear transducer, [employer] e 2008 model, 10-12 MHz frequencies. All measurements will be reported as longitudinal x neri-posterior x transverse. [Multinodular Goiter] : multinodular goiter [] : a heterogeneous parenchyma [Left Thyroid] : left [Mid] : mid pole there is a  [Solid] : solid [Heterogeneous] : heterogenous nodule [Round] : round in shape [Regular] : regular [No] : does not have a halo [Macrocalcifications] : macrocalcifications [Peripheral vascularity] : peripheral vascularity [FreeTextEntry1] : 3.98 x 2.63 x 2.72 [FreeTextEntry5] : 2.62 x 1.33 x 1.91 [FreeTextEntry2] : 0.47 [FreeTextEntry3] : 0.58 x 0.46 x 0.57

## 2023-03-31 NOTE — PROCEDURE
[[employer] e 2008 model, 10-12 MHz frequencies] : multiple real time longitudinal and transverse images were obtained using a high resolution ultrasound with a linear transducer, [employer] e 2008 model, 10-12 MHz frequencies. All measurements will be reported as longitudinal x neri-posterior x transverse. [Multinodular Goiter] : multinodular goiter [] : a heterogeneous parenchyma [Left Thyroid] : left [Mid] : mid pole there is a  [Solid] : solid [Heterogeneous] : heterogenous nodule [Round] : round in shape [Regular] : regular [No] : does not have a halo [Macrocalcifications] : macrocalcifications [Peripheral vascularity] : peripheral vascularity [FreeTextEntry1] : 3.98 x 2.63 x 2.72 [FreeTextEntry5] : 2.62 x 1.33 x 1.91 [FreeTextEntry2] : 0.47 [FreeTextEntry3] : 0.58 x 0.46 x 0.57

## 2023-03-31 NOTE — HISTORY OF PRESENT ILLNESS
[FreeTextEntry1] : Ms. EUN RANDALL is a 82  year old female who returns today in follow up with regard to a history of hypothyroidism. She is currently taking Synthroid 75 mcg daily  She has been compliant in taking the Synthroid  daily, away from food or any medication that may inhibit absorption. She has tolerated this medication well. Without any apparent adverse effects. She denies any temperature intolerance, significant weight changes, or severe fatigue. She in addition denies any palpitations, tremors, anxiousness, change in bowel habits or significant change in moods.\par \par  She too has hx of nodular thyroid with apparent benign fna around 10 years ago. No records on file, does not remember where she did it.  Follow up sonograms have apparently been stable/nodularity was smaller.\par No recent thyroid US \par \par PMD: Dr. Kristine Chan \par   \par Additional medical history includes hyperlipidemia and diffuse neuropathy for which she takes Nortriptyline.\par Is taking, Zetia 10 mg and captopril prn \par \par Too, is taking calcium supplement with D3 included. \par

## 2023-03-31 NOTE — PHYSICAL EXAM
[Alert] : alert [Well Nourished] : well nourished [No Acute Distress] : no acute distress [Well Developed] : well developed [Normal Sclera/Conjunctiva] : normal sclera/conjunctiva [EOMI] : extra ocular movement intact [No Proptosis] : no proptosis [Normal Oropharynx] : the oropharynx was normal [Thyroid Not Enlarged] : the thyroid was not enlarged [No Thyroid Nodules] : no palpable thyroid nodules [No Respiratory Distress] : no respiratory distress [No Accessory Muscle Use] : no accessory muscle use [Clear to Auscultation] : lungs were clear to auscultation bilaterally [Normal S1, S2] : normal S1 and S2 [Normal Rate] : heart rate was normal [No Edema] : no peripheral edema [Regular Rhythm] : with a regular rhythm [Pedal Pulses Normal] : the pedal pulses are present [Normal Bowel Sounds] : normal bowel sounds [Not Tender] : non-tender [Not Distended] : not distended [Soft] : abdomen soft [Normal Anterior Cervical Nodes] : no anterior cervical lymphadenopathy [Normal Posterior Cervical Nodes] : no posterior cervical lymphadenopathy [No Spinal Tenderness] : no spinal tenderness [Spine Straight] : spine straight [No Stigmata of Cushings Syndrome] : no stigmata of Cushings Syndrome [Normal Gait] : normal gait [No Rash] : no rash [Normal Strength/Tone] : muscle strength and tone were normal [Normal Reflexes] : deep tendon reflexes were 2+ and symmetric [No Tremors] : no tremors [Oriented x3] : oriented to person, place, and time [Acanthosis Nigricans] : no acanthosis nigricans

## 2023-04-03 LAB
25(OH)D3 SERPL-MCNC: 30.6 NG/ML
ALBUMIN SERPL ELPH-MCNC: 3.9 G/DL
ALP BLD-CCNC: 58 U/L
ALT SERPL-CCNC: 12 U/L
ANION GAP SERPL CALC-SCNC: 11 MMOL/L
AST SERPL-CCNC: 18 U/L
BILIRUB SERPL-MCNC: 0.2 MG/DL
BUN SERPL-MCNC: 25 MG/DL
CALCIUM SERPL-MCNC: 9.6 MG/DL
CHLORIDE SERPL-SCNC: 103 MMOL/L
CO2 SERPL-SCNC: 25 MMOL/L
CREAT SERPL-MCNC: 1.03 MG/DL
EGFR: 54 ML/MIN/1.73M2
GLUCOSE SERPL-MCNC: 84 MG/DL
POTASSIUM SERPL-SCNC: 5.1 MMOL/L
PROT SERPL-MCNC: 6.6 G/DL
SODIUM SERPL-SCNC: 139 MMOL/L
T3FREE SERPL-MCNC: 1.86 PG/ML
T4 FREE SERPL-MCNC: 1.3 NG/DL
THYROGLOB AB SERPL-ACNC: <20 IU/ML
THYROPEROXIDASE AB SERPL IA-ACNC: <10 IU/ML
TSH SERPL-ACNC: 1.97 UIU/ML
VIT B12 SERPL-MCNC: 420 PG/ML

## 2023-04-03 RX ORDER — MAGNESIUM 200 MG
1000 TABLET ORAL
Qty: 45 | Refills: 0 | Status: ACTIVE | COMMUNITY
Start: 2023-04-03 | End: 1900-01-01

## 2023-12-15 ENCOUNTER — APPOINTMENT (OUTPATIENT)
Dept: ENDOCRINOLOGY | Facility: CLINIC | Age: 83
End: 2023-12-15

## 2023-12-15 NOTE — HISTORY OF PRESENT ILLNESS
[FreeTextEntry1] : Ms. EUN RANDALL is an 83-year-old female who returns today in follow up with regard to a history of hypothyroidism. She is currently taking Synthroid 75 mcg daily She has been compliant in taking the Synthroid daily, away from food or any medication that may inhibit absorption. She has tolerated this medication well. Without any apparent adverse effects. She denies any temperature intolerance, significant weight changes, or severe fatigue. She in addition denies any palpitations, tremors, anxiousness, change in bowel habits or significant change in moods.  She too has hx of nodular thyroid with apparent benign fna around 10 years ago. No records on file, does not remember where she did it. Follow up sonograms have apparently been stable/nodularity was smaller.  No recent thyroid US  PMD: Dr. Kristine Chan  Additional medical history includes hyperlipidemia and diffuse neuropathy for which she takes Nortriptyline.  Is taking, Zetia 10 mg and captopril prn  Too, is taking calcium supplement with D3 included.

## 2024-01-03 ENCOUNTER — APPOINTMENT (OUTPATIENT)
Dept: ENDOCRINOLOGY | Facility: CLINIC | Age: 84
End: 2024-01-03
Payer: MEDICARE

## 2024-01-03 VITALS
BODY MASS INDEX: 34.56 KG/M2 | HEIGHT: 68 IN | HEART RATE: 62 BPM | DIASTOLIC BLOOD PRESSURE: 84 MMHG | OXYGEN SATURATION: 98 % | TEMPERATURE: 97.4 F | WEIGHT: 228 LBS | SYSTOLIC BLOOD PRESSURE: 140 MMHG

## 2024-01-03 DIAGNOSIS — R79.89 OTHER SPECIFIED ABNORMAL FINDINGS OF BLOOD CHEMISTRY: ICD-10-CM

## 2024-01-03 DIAGNOSIS — E55.9 VITAMIN D DEFICIENCY, UNSPECIFIED: ICD-10-CM

## 2024-01-03 DIAGNOSIS — E78.5 HYPERLIPIDEMIA, UNSPECIFIED: ICD-10-CM

## 2024-01-03 DIAGNOSIS — E04.1 NONTOXIC SINGLE THYROID NODULE: ICD-10-CM

## 2024-01-03 DIAGNOSIS — I10 ESSENTIAL (PRIMARY) HYPERTENSION: ICD-10-CM

## 2024-01-03 DIAGNOSIS — E03.9 HYPOTHYROIDISM, UNSPECIFIED: ICD-10-CM

## 2024-01-03 PROCEDURE — 36415 COLL VENOUS BLD VENIPUNCTURE: CPT

## 2024-01-03 PROCEDURE — 99214 OFFICE O/P EST MOD 30 MIN: CPT

## 2024-01-05 LAB
25(OH)D3 SERPL-MCNC: 30.2 NG/ML
ALBUMIN SERPL ELPH-MCNC: 4.4 G/DL
ALP BLD-CCNC: 66 U/L
ALT SERPL-CCNC: 12 U/L
ANION GAP SERPL CALC-SCNC: 15 MMOL/L
AST SERPL-CCNC: 18 U/L
BILIRUB SERPL-MCNC: 0.4 MG/DL
BUN SERPL-MCNC: 27 MG/DL
CALCIUM SERPL-MCNC: 10.1 MG/DL
CHLORIDE SERPL-SCNC: 103 MMOL/L
CHOLEST SERPL-MCNC: 213 MG/DL
CO2 SERPL-SCNC: 22 MMOL/L
CREAT SERPL-MCNC: 0.97 MG/DL
EGFR: 57 ML/MIN/1.73M2
GLUCOSE SERPL-MCNC: 88 MG/DL
HDLC SERPL-MCNC: 87 MG/DL
LDLC SERPL DIRECT ASSAY-MCNC: 114 MG/DL
POTASSIUM SERPL-SCNC: 4.7 MMOL/L
PROT SERPL-MCNC: 6.8 G/DL
SODIUM SERPL-SCNC: 140 MMOL/L
T3FREE SERPL-MCNC: 2.14 PG/ML
T4 FREE SERPL-MCNC: 1.4 NG/DL
THYROGLOB AB SERPL-ACNC: <20 IU/ML
THYROPEROXIDASE AB SERPL IA-ACNC: <10 IU/ML
TRIGL SERPL-MCNC: 83 MG/DL
TSH SERPL-ACNC: 1.68 UIU/ML
VIT B12 SERPL-MCNC: >2000 PG/ML

## 2024-01-06 PROBLEM — I10 HYPERTENSION: Status: ACTIVE | Noted: 2020-04-26

## 2024-01-06 PROBLEM — E55.9 VITAMIN D DEFICIENCY: Status: ACTIVE | Noted: 2020-04-23

## 2024-01-06 PROBLEM — E78.5 HYPERLIPIDEMIA: Status: ACTIVE | Noted: 2020-04-23

## 2024-01-06 PROBLEM — R79.89 LOW VITAMIN B12 LEVEL: Status: ACTIVE | Noted: 2023-04-03

## 2024-01-06 PROBLEM — E04.1 NODULAR THYROID DISEASE: Status: ACTIVE | Noted: 2020-04-26

## 2024-01-06 PROBLEM — E03.9 HYPOTHYROIDISM: Status: ACTIVE | Noted: 2020-04-23

## 2024-01-06 NOTE — HISTORY OF PRESENT ILLNESS
[FreeTextEntry1] : Ms. EUN RANDALL is an 83-year-old female who returns today in follow up with regard to a history of hypothyroidism. She is currently taking Synthroid 75 mcg daily She has been compliant in taking the Synthroid daily, away from food or any medication that may inhibit absorption. She has tolerated this medication well. Without any apparent adverse effects. She denies any temperature intolerance, significant weight changes, or severe fatigue. She in addition denies any palpitations, tremors, anxiousness, change in bowel habits or significant change in moods.  She too has hx of nodular thyroid with apparent benign fna around 10 years ago. No records on file, does not remember where she did it. Follow up sonograms have apparently been stable/nodularity was smaller.  Thyroid US from 03/31/23 showed one small nodule in the left lobe measuring 0.58 x 0.46 x 0.57 cm.  Denies anterior neck discomfort, difficulty swallowing, or any change in the appearance of the neck region.  Additional medical history includes hyperlipidemia and diffuse neuropathy for which she takes Nortriptyline.  Is taking, Zetia 10 mg and captopril prn Too, is taking vitamin D3 2,000 IU daily, vitamin B12 po, calcium supplement with D3 included.  PMD: Dr. Kristine Chan

## 2024-01-06 NOTE — ADDENDUM
[FreeTextEntry1] : Blood will be drawn in office today.  I have encouraged Ms. RANDALL  to continue follow up with Dr. Mary Ann Chan and I will keep Dr. Chan  updated along the way.

## 2025-01-29 NOTE — REASON FOR VISIT
Patient left a voicemail on the spine line returning call.    [Follow-Up Visit] : a follow-up visit for [Back Pain] : back pain [Radiculopathy] : radiculopathy